# Patient Record
Sex: FEMALE | Race: WHITE | NOT HISPANIC OR LATINO | URBAN - METROPOLITAN AREA
[De-identification: names, ages, dates, MRNs, and addresses within clinical notes are randomized per-mention and may not be internally consistent; named-entity substitution may affect disease eponyms.]

---

## 2017-11-01 ENCOUNTER — FOLLOW UP ESTABLISHED (OUTPATIENT)
Dept: URBAN - METROPOLITAN AREA CLINIC 24 | Facility: CLINIC | Age: 72
End: 2017-11-01
Payer: MEDICARE

## 2017-11-01 DIAGNOSIS — H40.051 OCULAR HYPERTENSION, RIGHT EYE: ICD-10-CM

## 2017-11-01 PROCEDURE — 92014 COMPRE OPH EXAM EST PT 1/>: CPT | Performed by: OPTOMETRIST

## 2017-11-01 ASSESSMENT — VISUAL ACUITY
OD: 20/30
OS: 20/30

## 2017-11-01 ASSESSMENT — KERATOMETRY
OD: 45.03
OS: 45.18

## 2017-11-01 ASSESSMENT — INTRAOCULAR PRESSURE
OD: 23
OS: 17

## 2018-01-04 ENCOUNTER — FOLLOW UP ESTABLISHED (OUTPATIENT)
Dept: URBAN - METROPOLITAN AREA CLINIC 24 | Facility: CLINIC | Age: 73
End: 2018-01-04
Payer: MEDICARE

## 2018-01-04 DIAGNOSIS — H04.123 DRY EYE SYNDROME OF BILATERAL LACRIMAL GLANDS: ICD-10-CM

## 2018-01-04 PROCEDURE — 92250 FUNDUS PHOTOGRAPHY W/I&R: CPT | Performed by: OPTOMETRIST

## 2018-01-04 PROCEDURE — 76514 ECHO EXAM OF EYE THICKNESS: CPT | Performed by: OPTOMETRIST

## 2018-01-04 PROCEDURE — 92012 INTRM OPH EXAM EST PATIENT: CPT | Performed by: OPTOMETRIST

## 2018-01-04 ASSESSMENT — INTRAOCULAR PRESSURE
OD: 24
OS: 18

## 2018-04-05 ENCOUNTER — FOLLOW UP ESTABLISHED (OUTPATIENT)
Dept: URBAN - METROPOLITAN AREA CLINIC 24 | Facility: CLINIC | Age: 73
End: 2018-04-05
Payer: MEDICARE

## 2018-04-05 DIAGNOSIS — H43.813 VITREOUS DEGENERATION, BILATERAL: ICD-10-CM

## 2018-04-05 PROCEDURE — 92083 EXTENDED VISUAL FIELD XM: CPT | Performed by: OPTOMETRIST

## 2018-04-05 PROCEDURE — 92014 COMPRE OPH EXAM EST PT 1/>: CPT | Performed by: OPTOMETRIST

## 2018-04-05 ASSESSMENT — INTRAOCULAR PRESSURE
OD: 25
OS: 20

## 2018-10-11 ENCOUNTER — FOLLOW UP ESTABLISHED (OUTPATIENT)
Dept: URBAN - METROPOLITAN AREA CLINIC 24 | Facility: CLINIC | Age: 73
End: 2018-10-11
Payer: MEDICARE

## 2018-10-11 PROCEDURE — 92012 INTRM OPH EXAM EST PATIENT: CPT | Performed by: OPTOMETRIST

## 2018-10-11 ASSESSMENT — VISUAL ACUITY
OD: 20/25
OS: 20/30

## 2018-10-11 ASSESSMENT — INTRAOCULAR PRESSURE
OS: 15
OD: 24

## 2019-04-18 ENCOUNTER — FOLLOW UP ESTABLISHED (OUTPATIENT)
Dept: URBAN - METROPOLITAN AREA CLINIC 24 | Facility: CLINIC | Age: 74
End: 2019-04-18
Payer: MEDICARE

## 2019-04-18 PROCEDURE — 92083 EXTENDED VISUAL FIELD XM: CPT | Performed by: OPTOMETRIST

## 2019-04-18 PROCEDURE — 92133 CPTRZD OPH DX IMG PST SGM ON: CPT | Performed by: OPTOMETRIST

## 2019-04-18 PROCEDURE — 92014 COMPRE OPH EXAM EST PT 1/>: CPT | Performed by: OPTOMETRIST

## 2019-04-18 RX ORDER — TIMOLOL MALEATE 5 MG/ML
0.5 % SOLUTION/ DROPS OPHTHALMIC
Qty: 1 | Refills: 3 | Status: INACTIVE
Start: 2019-04-18 | End: 2019-07-08

## 2019-04-18 ASSESSMENT — INTRAOCULAR PRESSURE
OS: 16
OD: 27

## 2019-04-18 ASSESSMENT — KERATOMETRY: OS: 45.34

## 2019-04-18 ASSESSMENT — VISUAL ACUITY
OD: 20/25
OS: 20/25

## 2019-07-08 ENCOUNTER — FOLLOW UP ESTABLISHED (OUTPATIENT)
Dept: URBAN - METROPOLITAN AREA CLINIC 24 | Facility: CLINIC | Age: 74
End: 2019-07-08
Payer: MEDICARE

## 2019-07-08 PROCEDURE — 92020 GONIOSCOPY: CPT | Performed by: OPTOMETRIST

## 2019-07-08 PROCEDURE — 92012 INTRM OPH EXAM EST PATIENT: CPT | Performed by: OPTOMETRIST

## 2019-07-08 PROCEDURE — 92250 FUNDUS PHOTOGRAPHY W/I&R: CPT | Performed by: OPTOMETRIST

## 2019-07-08 RX ORDER — LATANOPROST 50 UG/ML
0.005 % SOLUTION OPHTHALMIC
Qty: 1 | Refills: 6 | Status: INACTIVE
Start: 2019-07-08 | End: 2020-05-11

## 2019-07-08 ASSESSMENT — INTRAOCULAR PRESSURE
OS: 20
OD: 29

## 2019-09-09 ENCOUNTER — FOLLOW UP ESTABLISHED (OUTPATIENT)
Dept: URBAN - METROPOLITAN AREA CLINIC 24 | Facility: CLINIC | Age: 74
End: 2019-09-09
Payer: MEDICARE

## 2019-09-09 PROCEDURE — 92012 INTRM OPH EXAM EST PATIENT: CPT | Performed by: OPTOMETRIST

## 2019-09-09 ASSESSMENT — INTRAOCULAR PRESSURE
OS: 18
OD: 21

## 2020-05-26 ENCOUNTER — FOLLOW UP ESTABLISHED (OUTPATIENT)
Dept: URBAN - METROPOLITAN AREA CLINIC 24 | Facility: CLINIC | Age: 75
End: 2020-05-26
Payer: MEDICARE

## 2020-05-26 DIAGNOSIS — Z96.1 PRESENCE OF INTRAOCULAR LENS: ICD-10-CM

## 2020-05-26 PROCEDURE — 92083 EXTENDED VISUAL FIELD XM: CPT | Performed by: OPTOMETRIST

## 2020-05-26 PROCEDURE — 92014 COMPRE OPH EXAM EST PT 1/>: CPT | Performed by: OPTOMETRIST

## 2020-05-26 PROCEDURE — 92133 CPTRZD OPH DX IMG PST SGM ON: CPT | Performed by: OPTOMETRIST

## 2020-05-26 PROCEDURE — 92134 CPTRZ OPH DX IMG PST SGM RTA: CPT | Performed by: OPTOMETRIST

## 2020-05-26 ASSESSMENT — VISUAL ACUITY
OD: 20/20
OS: 20/20

## 2020-05-26 ASSESSMENT — INTRAOCULAR PRESSURE
OS: 16
OS: 17
OD: 23
OD: 21

## 2020-05-29 ENCOUNTER — FOLLOW UP ESTABLISHED (OUTPATIENT)
Dept: URBAN - METROPOLITAN AREA CLINIC 24 | Facility: CLINIC | Age: 75
End: 2020-05-29
Payer: MEDICARE

## 2020-05-29 DIAGNOSIS — H40.1131 PRIMARY OPEN-ANGLE GLAUCOMA, BILATERAL, MILD STAGE: Primary | ICD-10-CM

## 2020-05-29 PROCEDURE — 92014 COMPRE OPH EXAM EST PT 1/>: CPT | Performed by: OPHTHALMOLOGY

## 2020-05-29 ASSESSMENT — INTRAOCULAR PRESSURE
OS: 18
OD: 22

## 2020-06-19 ENCOUNTER — SURGERY (OUTPATIENT)
Dept: URBAN - METROPOLITAN AREA SURGERY 12 | Facility: SURGERY | Age: 75
End: 2020-06-19
Payer: MEDICARE

## 2020-06-19 PROCEDURE — 65855 TRABECULOPLASTY LASER SURG: CPT | Performed by: OPHTHALMOLOGY

## 2020-07-17 ENCOUNTER — SURGERY (OUTPATIENT)
Dept: URBAN - METROPOLITAN AREA SURGERY 12 | Facility: SURGERY | Age: 75
End: 2020-07-17
Payer: MEDICARE

## 2020-07-17 PROCEDURE — 65855 TRABECULOPLASTY LASER SURG: CPT | Performed by: OPHTHALMOLOGY

## 2020-07-17 RX ORDER — LATANOPROST 50 UG/ML
0.005 % SOLUTION OPHTHALMIC
Qty: 3 | Refills: 1 | Status: INACTIVE
Start: 2020-07-17 | End: 2020-11-18

## 2020-08-26 ENCOUNTER — FOLLOW UP ESTABLISHED (OUTPATIENT)
Dept: URBAN - METROPOLITAN AREA CLINIC 24 | Facility: CLINIC | Age: 75
End: 2020-08-26
Payer: MEDICARE

## 2020-08-26 PROCEDURE — 92014 COMPRE OPH EXAM EST PT 1/>: CPT | Performed by: OPHTHALMOLOGY

## 2020-08-26 ASSESSMENT — INTRAOCULAR PRESSURE
OD: 22
OS: 17

## 2020-10-07 ENCOUNTER — FOLLOW UP ESTABLISHED (OUTPATIENT)
Dept: URBAN - METROPOLITAN AREA CLINIC 24 | Facility: CLINIC | Age: 75
End: 2020-10-07
Payer: MEDICARE

## 2020-10-07 PROCEDURE — 92014 COMPRE OPH EXAM EST PT 1/>: CPT | Performed by: OPHTHALMOLOGY

## 2020-10-07 ASSESSMENT — INTRAOCULAR PRESSURE
OS: 15
OD: 19

## 2020-11-18 ENCOUNTER — FOLLOW UP ESTABLISHED (OUTPATIENT)
Dept: URBAN - METROPOLITAN AREA CLINIC 24 | Facility: CLINIC | Age: 75
End: 2020-11-18
Payer: MEDICARE

## 2020-11-18 PROCEDURE — 92012 INTRM OPH EXAM EST PATIENT: CPT | Performed by: OPHTHALMOLOGY

## 2020-11-18 RX ORDER — LATANOPROST 50 UG/ML
0.005 % SOLUTION OPHTHALMIC
Qty: 3 | Refills: 1 | Status: INACTIVE
Start: 2020-11-18 | End: 2021-02-11

## 2020-11-18 ASSESSMENT — INTRAOCULAR PRESSURE
OS: 15
OD: 17

## 2021-05-20 ENCOUNTER — OFFICE VISIT (OUTPATIENT)
Dept: URBAN - METROPOLITAN AREA CLINIC 24 | Facility: CLINIC | Age: 76
End: 2021-05-20
Payer: MEDICARE

## 2021-05-20 PROCEDURE — 99213 OFFICE O/P EST LOW 20 MIN: CPT | Performed by: OPHTHALMOLOGY

## 2021-05-20 PROCEDURE — 92083 EXTENDED VISUAL FIELD XM: CPT | Performed by: OPHTHALMOLOGY

## 2021-05-20 ASSESSMENT — INTRAOCULAR PRESSURE
OD: 19
OS: 15

## 2021-05-20 NOTE — IMPRESSION/PLAN
Impression: Primary open-angle glaucoma, mild stage, bilateral
SLT Ineffective. Rocklatan- allergic,  Timolol - not tolerable Plan: Pt has Glaucoma    Gonio :  ss 3+ pg / ss 2-3+ pg        Pachs:  536/545    Today's IOP : 19/15      Tmax & date :  34, 20 Target IOP low to mid teens [facility] OU 
HVF 24-2 (05/20/2021) Full ou 
C/D:  .5-.3 / .5-.4 
OCT: (5/20/2021) 63, 71 Plan :
1. Cont:
Lumigan QHS OU 
2. IOP S/P SLT ineffective 3. IOP and condition appear stable today. No changes being made to current regimen. Recommend monitoring condition at this time.  
4. Rtc in 6 months IOP & RNFL

## 2021-11-18 ENCOUNTER — OFFICE VISIT (OUTPATIENT)
Dept: URBAN - METROPOLITAN AREA CLINIC 24 | Facility: CLINIC | Age: 76
End: 2021-11-18
Payer: MEDICARE

## 2021-11-18 PROCEDURE — 99214 OFFICE O/P EST MOD 30 MIN: CPT | Performed by: OPHTHALMOLOGY

## 2021-11-18 PROCEDURE — 92133 CPTRZD OPH DX IMG PST SGM ON: CPT | Performed by: OPHTHALMOLOGY

## 2021-11-18 ASSESSMENT — INTRAOCULAR PRESSURE
OS: 17
OD: 20

## 2022-02-01 ENCOUNTER — OFFICE VISIT (OUTPATIENT)
Dept: URBAN - METROPOLITAN AREA CLINIC 24 | Facility: CLINIC | Age: 77
End: 2022-02-01
Payer: MEDICARE

## 2022-02-01 DIAGNOSIS — H01.009 UNSPECIFIED BLEPHARITIS UNSPECIFIED EYE, UNSPECIFIED EYELID: ICD-10-CM

## 2022-02-01 PROCEDURE — 92134 CPTRZ OPH DX IMG PST SGM RTA: CPT | Performed by: OPTOMETRIST

## 2022-02-01 PROCEDURE — 99214 OFFICE O/P EST MOD 30 MIN: CPT | Performed by: OPTOMETRIST

## 2022-02-01 RX ORDER — TAFLUPROST 0 MG/.3ML
0.0015 % SOLUTION/ DROPS OPHTHALMIC
Qty: 90 | Refills: 3 | Status: ACTIVE
Start: 2022-02-01

## 2022-02-01 ASSESSMENT — VISUAL ACUITY
OD: 20/25
OS: 20/20

## 2022-02-01 ASSESSMENT — INTRAOCULAR PRESSURE
OD: 9
OS: 10

## 2022-02-01 NOTE — IMPRESSION/PLAN
Impression: Presence of intraocular lens ; OU Restor Plan: Well positioned PC IOL(s). RESTOR S/P YAG OU.

## 2022-02-01 NOTE — IMPRESSION/PLAN
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 Plan: Continue frequent afts. Add lid hygiene ([address] info given) Add ketotifen bid ou prn for sac

## 2022-02-01 NOTE — IMPRESSION/PLAN
Impression: Primary open-angle glaucoma, mild stage, bilateral
-- Tmax & date :  34, 20
-- Gonio :  ss 3+ pg / ss 2-3+ pg        
-- Pachs:  536/545    
-- no fohx
-- denies lung disease, (-) sulfa allergy -- trouble w/ latanoprost (surface) -- Target IOP low to mid teens Plan: IOP improved, tolerance of gtts improved w/ switch to zioptan Recommend: 1.  Zioptan QHS OU (started 11/18/21)

## 2022-08-01 ENCOUNTER — OFFICE VISIT (OUTPATIENT)
Dept: URBAN - METROPOLITAN AREA CLINIC 24 | Facility: CLINIC | Age: 77
End: 2022-08-01
Payer: MEDICARE

## 2022-08-01 DIAGNOSIS — H40.1131 PRIMARY OPEN-ANGLE GLAUCOMA, BILATERAL, MILD STAGE: Primary | ICD-10-CM

## 2022-08-01 PROCEDURE — 99213 OFFICE O/P EST LOW 20 MIN: CPT | Performed by: OPTOMETRIST

## 2022-08-01 PROCEDURE — 92250 FUNDUS PHOTOGRAPHY W/I&R: CPT | Performed by: OPTOMETRIST

## 2022-08-01 PROCEDURE — 92083 EXTENDED VISUAL FIELD XM: CPT | Performed by: OPTOMETRIST

## 2022-08-01 ASSESSMENT — INTRAOCULAR PRESSURE
OS: 15
OD: 18

## 2022-08-01 NOTE — IMPRESSION/PLAN
Impression: Primary open-angle glaucoma, mild stage, bilateral
-- s/p SLT June / July '20; MAXINE Merchant MD
-- Tmax & date :  34, 21
-- Gonio :  ss 3+ pg / ss 2-3+ pg        
-- Pachs:  536/545    
-- no fohx
-- denies lung disease, (-) sulfa allergy -- trouble w/ latanoprost (surface) -- Target IOP low to mid teens Plan: Updated HVF 24-2 (full OU), disc photos (cupping ou) today. IOP borderline, tolerance of gtts improved w/ switch to zioptan Recommend: 1.  Zioptan QHS OU (started 11/18/21)

## 2023-02-02 ENCOUNTER — OFFICE VISIT (OUTPATIENT)
Dept: URBAN - METROPOLITAN AREA CLINIC 24 | Facility: CLINIC | Age: 78
End: 2023-02-02
Payer: MEDICARE

## 2023-02-02 DIAGNOSIS — H40.1131 PRIMARY OPEN-ANGLE GLAUCOMA, BILATERAL, MILD STAGE: Primary | ICD-10-CM

## 2023-02-02 DIAGNOSIS — Z96.1 PRESENCE OF INTRAOCULAR LENS: ICD-10-CM

## 2023-02-02 DIAGNOSIS — H43.813 VITREOUS DEGENERATION, BILATERAL: ICD-10-CM

## 2023-02-02 DIAGNOSIS — H16.143 PUNCTATE KERATITIS, BILATERAL: ICD-10-CM

## 2023-02-02 PROCEDURE — 99214 OFFICE O/P EST MOD 30 MIN: CPT | Performed by: OPTOMETRIST

## 2023-02-02 PROCEDURE — 92133 CPTRZD OPH DX IMG PST SGM ON: CPT | Performed by: OPTOMETRIST

## 2023-02-02 ASSESSMENT — INTRAOCULAR PRESSURE
OD: 14
OS: 14

## 2023-02-02 ASSESSMENT — VISUAL ACUITY
OD: 20/50
OS: 20/40

## 2023-02-02 NOTE — IMPRESSION/PLAN
Impression: Presence of intraocular lens ; OU Restor Plan: Well positioned PC IOL(s). RESTOR S/P YAG OU. 
Recommend trial yellow tinted srx

## 2023-02-02 NOTE — IMPRESSION/PLAN
Impression: Punctate keratitis, bilateral: H16.143.  Plan: Recommend consistent use of afts, including qhs ou

## 2023-02-02 NOTE — IMPRESSION/PLAN
Impression: Primary open-angle glaucoma, mild stage, bilateral
-- s/p SLT June / July '20; MAXINE Cutler MD
-- Tmax & date :  34, 21
-- Gonio :  ss 3+ pg / ss 2-3+ pg        
-- Pachs:  536/545    
-- no fohx
-- denies lung disease, (-) sulfa allergy -- trouble w/ latanoprost (surface) -- Target IOP low to mid teens Plan: Updated rnfl oct IOP today 14 ou 
IOP borderline, tolerance of gtts improved w/ switch to zioptan Recommend: 1.  Zioptan QHS OU (started 11/18/21) (pt is alternating nightly between latanoprost and zioptan -- reports tolerance improved)

## 2023-08-04 ENCOUNTER — OFFICE VISIT (OUTPATIENT)
Dept: URBAN - METROPOLITAN AREA CLINIC 24 | Facility: CLINIC | Age: 78
End: 2023-08-04
Payer: MEDICARE

## 2023-08-04 DIAGNOSIS — H40.1131 PRIMARY OPEN-ANGLE GLAUCOMA, BILATERAL, MILD STAGE: Primary | ICD-10-CM

## 2023-08-04 PROCEDURE — 92083 EXTENDED VISUAL FIELD XM: CPT | Performed by: OPTOMETRIST

## 2023-08-04 PROCEDURE — 99213 OFFICE O/P EST LOW 20 MIN: CPT | Performed by: OPTOMETRIST

## 2023-08-04 PROCEDURE — 92250 FUNDUS PHOTOGRAPHY W/I&R: CPT | Performed by: OPTOMETRIST

## 2023-08-04 ASSESSMENT — INTRAOCULAR PRESSURE
OD: 16
OS: 17
OS: 16
OD: 17